# Patient Record
Sex: MALE | Race: WHITE | NOT HISPANIC OR LATINO | Employment: FULL TIME | URBAN - METROPOLITAN AREA
[De-identification: names, ages, dates, MRNs, and addresses within clinical notes are randomized per-mention and may not be internally consistent; named-entity substitution may affect disease eponyms.]

---

## 2021-05-26 ENCOUNTER — RECORDS - HEALTHEAST (OUTPATIENT)
Dept: ADMINISTRATIVE | Facility: CLINIC | Age: 21
End: 2021-05-26

## 2021-05-30 ENCOUNTER — RECORDS - HEALTHEAST (OUTPATIENT)
Dept: ADMINISTRATIVE | Facility: CLINIC | Age: 21
End: 2021-05-30

## 2023-01-02 ENCOUNTER — HOSPITAL ENCOUNTER (EMERGENCY)
Facility: CLINIC | Age: 23
Discharge: HOME | End: 2023-01-02
Attending: STUDENT IN AN ORGANIZED HEALTH CARE EDUCATION/TRAINING PROGRAM | Admitting: STUDENT IN AN ORGANIZED HEALTH CARE EDUCATION/TRAINING PROGRAM
Payer: COMMERCIAL

## 2023-01-02 VITALS
SYSTOLIC BLOOD PRESSURE: 121 MMHG | TEMPERATURE: 98.7 F | HEART RATE: 74 BPM | RESPIRATION RATE: 16 BRPM | OXYGEN SATURATION: 99 % | WEIGHT: 145 LBS | DIASTOLIC BLOOD PRESSURE: 68 MMHG

## 2023-01-02 DIAGNOSIS — S61.313A LACERATION OF LEFT MIDDLE FINGER WITHOUT FOREIGN BODY WITH DAMAGE TO NAIL, INITIAL ENCOUNTER: ICD-10-CM

## 2023-01-02 PROCEDURE — 99282 EMERGENCY DEPT VISIT SF MDM: CPT

## 2023-01-02 PROCEDURE — 99283 EMERGENCY DEPT VISIT LOW MDM: CPT

## 2023-01-02 RX ORDER — CEPHALEXIN 500 MG/1
500 CAPSULE ORAL 4 TIMES DAILY
Qty: 28 CAPSULE | Refills: 0 | Status: SHIPPED | OUTPATIENT
Start: 2023-01-02 | End: 2023-01-09

## 2023-01-02 ASSESSMENT — ENCOUNTER SYMPTOMS
NUMBNESS: 0
WOUND: 1

## 2023-01-02 NOTE — DISCHARGE INSTRUCTIONS
Keep area cleaned and wound dressed  Will prescribe antibiotics to prevent infection  Follow up within next couple of days for wound recheck (with primary care or with hand)  Return to the Emergency Room with significant increase in pain, numbness, fevers, redness/drainage or other worsening symptoms or concerns

## 2023-01-02 NOTE — ED TRIAGE NOTES
Cut finger slicing onion this am.     Triage Assessment     Row Name 01/02/23 0730       Triage Assessment (Adult)    Airway WDL WDL       Respiratory WDL    Respiratory WDL WDL       Skin Circulation/Temperature WDL    Skin Circulation/Temperature WDL WDL       Cardiac WDL    Cardiac WDL WDL       Peripheral/Neurovascular WDL    Peripheral Neurovascular WDL WDL       Cognitive/Neuro/Behavioral WDL    Cognitive/Neuro/Behavioral WDL WDL

## 2023-01-02 NOTE — ED PROVIDER NOTES
NAME: Damion Waldron  AGE: 22 year old male  YOB: 2000  MRN: [id number]  EVALUATION DATE & TIME: 2023  7:27 AM    PCP: No primary care provider on file.  ED PROVIDER: Lilia James MD.    Chief Complaint   Patient presents with     Laceration       FINAL IMPRESSION:  1. Laceration of left middle finger without foreign body with damage to nail, initial encounter        MEDICAL DECISION MAKIN:30 AM I met with the patient, obtained history, performed an initial exam, and discussed options and plan for diagnostics and treatment here in the ED.   7:41 AM I rechecked the patient and cleaned his laceration wound.     23 y/o M who presents with finger laceration while cutting onions. Is neurovascularly intact. Tetanus is UTD. Patient declined digital block prior to cleaning. Area was soaked/cleaned. Distal medial part of the nail is gone with some underlying tissue injury but no areas to nail bed that appears to need suture repair. No evidence of bone involvement and do not feel he requires xray. Will place wet/dry dressing, start prophylactic antibiotics and have him follow up closely for wound recheck. Strict return precautions discussed and patient is in agreement with plan, endorses understanding and their questions were answered.    Medical Decision Making    History:    Supplemental history from: Documented in HPI, if applicable    External Record(s) reviewed: Documented in HPI, if applicable.    Work Up:    Chart documentation includes differential considered and any EKGs or imaging interpreted by provider.    In additional to work up documented, I considered the following work up: See chart documentation, if applicable.    External consultation:    Discussion of management with another provider: See chart documentation, if applicable    Complicating factors:    Care impacted by chronic illness: N/A    Care affected by social determinants of health: N/A       Disposition considerations:  Discharge. I prescribed additional prescription strength medication(s) as charted      MEDICATIONS GIVEN IN THE EMERGENCY:  Medications - No data to display    NEW PRESCRIPTIONS STARTED AT TODAY'S ER VISIT:  New Prescriptions    CEPHALEXIN (KEFLEX) 500 MG CAPSULE    Take 1 capsule (500 mg) by mouth 4 times daily for 7 days        =================================================================  HPI    Patient information was obtained from: Patient   Use of : N/A       Damion Waldron is a 22 year old male with no contributory medical history, who presents to the ED via walk in for the evaluation of laceration.    The patient reports that he was cutting an onion this morning with a knife when his hand accidentally slipped and cut his left middle finger. He endorses left middle finger pain and notes that the bleeding has been constant. Denies any other pain. No numbness.      REVIEW OF SYSTEMS   Review of Systems   Skin: Positive for wound (Left middle finger).   Neurological: Negative for numbness.   All other systems reviewed and are negative.       PAST MEDICAL HISTORY:  History reviewed. No pertinent past medical history.    PAST SURGICAL HISTORY:  No past surgical history on file.    CURRENT MEDICATIONS:    No current facility-administered medications for this encounter.    Current Outpatient Medications:      cephALEXin (KEFLEX) 500 MG capsule, Take 1 capsule (500 mg) by mouth 4 times daily for 7 days, Disp: 28 capsule, Rfl: 0    ALLERGIES:  No Known Allergies    FAMILY HISTORY:  No family history on file.    SOCIAL HISTORY:   Social History     Socioeconomic History     Marital status: Single       PHYSICAL EXAM:    Vitals: /68   Pulse 74   Temp 98.7  F (37.1  C) (Oral)   Resp 16   Wt 65.8 kg (145 lb)   SpO2 99%    Constitutional: Well developed, well nourished. No acute distress  HENT: Normocephalic, atraumatic  Eyes: Pupils mid range, sclera white, no discharge  Neck- Gross ROM intact.    Respiratory: Normal work of breathing, normal rate, speaks in full sentences  Cardiovascular: Normal heart rate. Normal capillary refill to left finger  Musculoskeletal: Left middle finger without medial distal nail, is superficial underlying nail bed injury, no lacerations seen, no significant bleeding  Neurologic: Alert & oriented x 3, cranial nerves grossly intact. Speech clear. Strength/sensation intact in the left hand  Psychiatric: Affect normal, cooperative.    LAB:  All pertinent labs reviewed and interpreted.  Labs Ordered and Resulted from Time of ED Arrival to Time of ED Departure - No data to display    RADIOLOGY:  No orders to display       EKG:   N/A    PROCEDURES:   Procedures     I, Lincoln Kenyon, am serving as a scribe to document services personally performed by Dr. Lilia James based on my observation and the provider's statements to me. I, Lilia James MD attest that Lincoln Kenyon is acting in a scribe capacity, has observed my performance of the services and has documented them in accordance with my direction.    Lilia James M.D.  Emergency Medicine  [facility] EMERGENCY ROOM  [address] [place] [id number]  [phone]  Dept: [phone]     Lilia James MD  01/02/23 1663

## 2023-05-20 ENCOUNTER — HEALTH MAINTENANCE LETTER (OUTPATIENT)
Age: 23
End: 2023-05-20

## 2023-10-21 ENCOUNTER — HEALTH MAINTENANCE LETTER (OUTPATIENT)
Age: 23
End: 2023-10-21

## 2024-12-14 ENCOUNTER — HEALTH MAINTENANCE LETTER (OUTPATIENT)
Age: 24
End: 2024-12-14